# Patient Record
Sex: MALE | Race: AMERICAN INDIAN OR ALASKA NATIVE
[De-identification: names, ages, dates, MRNs, and addresses within clinical notes are randomized per-mention and may not be internally consistent; named-entity substitution may affect disease eponyms.]

---

## 2019-01-21 ENCOUNTER — HOSPITAL ENCOUNTER (OUTPATIENT)
Dept: HOSPITAL 5 - GIO | Age: 54
Discharge: HOME | End: 2019-01-21
Attending: INTERNAL MEDICINE
Payer: COMMERCIAL

## 2019-01-21 VITALS — DIASTOLIC BLOOD PRESSURE: 90 MMHG | SYSTOLIC BLOOD PRESSURE: 138 MMHG

## 2019-01-21 DIAGNOSIS — Z98.890: ICD-10-CM

## 2019-01-21 DIAGNOSIS — K62.82: Primary | ICD-10-CM

## 2019-01-21 DIAGNOSIS — Z96.643: ICD-10-CM

## 2019-01-21 DIAGNOSIS — E78.00: ICD-10-CM

## 2019-01-21 DIAGNOSIS — K64.8: ICD-10-CM

## 2019-01-21 DIAGNOSIS — Z79.899: ICD-10-CM

## 2019-01-21 PROCEDURE — 45380 COLONOSCOPY AND BIOPSY: CPT

## 2019-01-21 PROCEDURE — 88305 TISSUE EXAM BY PATHOLOGIST: CPT

## 2019-01-21 NOTE — DISCHARGE SUMMARY
Short Stay Discharge Plan


Activity: advance as tolerated


Weight Bearing Status: Weight Bear as Tolerated


Diet: regular


Additional Instructions: 


                                            Post Sedation D/C Instructions





When you return home you may resume your regular diet unless otherwise directed.







-Go directly home from the hospital and rest quietly. You may resume normal 

activities tomorrow.





 -Do NOT drive, return to work, operate any machinery or make any important 

personal or business decisions today. 





-Do NOT drink any alcohol or take nerve or sleeping drugs. They add to the 

effects of the medicine still present in your body. 





NO ASPIRIN OR NSAIDS FOR 2-3 DAYS





CALL OFFICE IN 1-2 WEEKS IF YOU HAVE NOT RECEIVED CALL FROM THEM TO FOLLOW UP ON

BIOPSY FINDINGS


Follow up with: 


PRIMARY CARE,MD [Primary Care Provider] - 7 Days

## 2019-01-21 NOTE — ANESTHESIA CONSULTATION
Anesthesia Consult and Med Hx


Date of service: 01/21/19





- Airway


Anesthetic Teeth Evaluation: Good


ROM Head & Neck: Adequate


Mental/Hyoid Distance: Adequate


Mallampati Class: Class II


Intubation Access Assessment: Probably Good





- Pulmonary Exam


CTA: Yes





- Cardiac Exam


Cardiac Exam: RRR





- Pre-Operative Health Status


ASA Pre-Surgery Classification: ASA2


Proposed Anesthetic Plan: MAC





- Central Nervous System


Hx Back Pain: Yes (Chronic)

## 2019-01-21 NOTE — OPERATIVE REPORT
Operative Report


Operative Report: 


Date of procedure: 01/21/2019





Procedure: Colonoscopy with cold biopsy





Attending physician: José Mabry MD





: José Mabry MD





Indication: Patient is a 53-year-old male who presents for colonoscopy.  He has 

a history of rectal bleeding and rectal pain He has a past history of 

constipation.  A colonoscopy serves to evaluate patients symptoms so that 

treatment may be directed based on the findings.





Consent: Informed consent was obtained after advising the patient and family 

regarding nature of this procedure, its indications, potential benefits as well 

as possible complications including but not limited to bleeding perforation and 

adverse reaction to medication, infection as well as other cardiopulmonary 

complications.  An informed written and verbal consent was then obtained after 

due opportunity was provided for questions and answers.





Monitoring: Patient was monitored continuously with pulse oximetry and 

electrocardiographic recordings as well as blood pressure recordings.  Vital 

signs remained stable throughout this procedure with no untoward events.





Preoperative assessment: Patient was assessed immediately prior to this 

procedure for capacity to tolerate monitored anesthesia care and moderate 

sedation as well as general anesthesia.  Patient's ASA classification is 2, 

Mallampati class is 2, Hyomental distance is 3.





Instrument: GridCraft videocolonoscope.








Medications: Propofol given intravenously in divided doses.  For details please 

refer to anesthesia records.





Description of procedure: Patient was placed in the left lateral decubitus 

position after achieving sedation, a digital rectal examination was performed 

following which the colonoscope was introduced into the anal verge and advanced 

to the cecum which was identified by the cecal valve, the appendiceal orifice, 

as well as by the cecal strap and direct transillumination.  The colonoscope was

subsequently withdrawn with careful inspection of all mucosal surfaces.  Patient

tolerated this procedure well and was subsequently taken to the recovery room.  

The following findings were noted.





Findings: Patient had residual stool in the colon, which was irrigated.  The 

rest of the colon to the cecum was normal.  On the retroflex view at the anal 

verge, patient had friable irregular edematous mucosa projecting out of the 

surface of the anal lining in an exophytic pattern with a somewhat papilliform 

appearance and areas of hypopigmentation.  Several biopsies were obtained for 

histopathology.  The endoscopic appearance raises concerns for possible 

condyloma acuminata.  Patient tolerated the procedure well with no untoward 

events.





Impression: Abnormal appearing anal verge mucosa status post biopsies


Internal hemorrhoids.





Plan: Follow-up pathology report


Daily sitz baths as needed.


High-fiber diet.


Patient to use stool softeners as needed.  MiraLAX 17 g in 8 ounce glass of 

water has been prescribed.


Patient is scheduled for further outpatient follow-up. 


Additional terminations may be made depending on the pathology report.


If pathology suggests condyloma, patient will be scheduled for sigmoidoscopy 

with ablation of anal condyloma.